# Patient Record
Sex: FEMALE | Race: WHITE | NOT HISPANIC OR LATINO
[De-identification: names, ages, dates, MRNs, and addresses within clinical notes are randomized per-mention and may not be internally consistent; named-entity substitution may affect disease eponyms.]

---

## 2017-03-07 ENCOUNTER — RESULT REVIEW (OUTPATIENT)
Age: 57
End: 2017-03-07

## 2018-01-17 ENCOUNTER — EMERGENCY (EMERGENCY)
Facility: HOSPITAL | Age: 58
LOS: 1 days | Discharge: HOME | End: 2018-01-17
Admitting: EMERGENCY MEDICINE
Payer: COMMERCIAL

## 2018-01-17 VITALS
TEMPERATURE: 98 F | SYSTOLIC BLOOD PRESSURE: 121 MMHG | RESPIRATION RATE: 16 BRPM | DIASTOLIC BLOOD PRESSURE: 77 MMHG | HEART RATE: 80 BPM | OXYGEN SATURATION: 99 %

## 2018-01-17 PROCEDURE — 73120 X-RAY EXAM OF HAND: CPT

## 2018-01-17 PROCEDURE — 29125 APPL SHORT ARM SPLINT STATIC: CPT

## 2018-01-17 PROCEDURE — 99053 MED SERV 10PM-8AM 24 HR FAC: CPT

## 2018-01-17 PROCEDURE — 73110 X-RAY EXAM OF WRIST: CPT | Mod: 26,RT

## 2018-01-17 PROCEDURE — 29125 APPL SHORT ARM SPLINT STATIC: CPT | Mod: RT

## 2018-01-17 PROCEDURE — 99284 EMERGENCY DEPT VISIT MOD MDM: CPT | Mod: 25

## 2018-01-17 PROCEDURE — 73110 X-RAY EXAM OF WRIST: CPT

## 2018-01-17 PROCEDURE — 73120 X-RAY EXAM OF HAND: CPT | Mod: 26,50

## 2018-01-17 PROCEDURE — 90715 TDAP VACCINE 7 YRS/> IM: CPT

## 2018-01-17 PROCEDURE — 90471 IMMUNIZATION ADMIN: CPT

## 2018-01-17 RX ORDER — ACETAMINOPHEN 500 MG
975 TABLET ORAL ONCE
Qty: 0 | Refills: 0 | Status: COMPLETED | OUTPATIENT
Start: 2018-01-17 | End: 2018-01-17

## 2018-01-17 RX ORDER — TETANUS TOXOID, REDUCED DIPHTHERIA TOXOID AND ACELLULAR PERTUSSIS VACCINE, ADSORBED 5; 2.5; 8; 8; 2.5 [IU]/.5ML; [IU]/.5ML; UG/.5ML; UG/.5ML; UG/.5ML
0.5 SUSPENSION INTRAMUSCULAR ONCE
Qty: 0 | Refills: 0 | Status: COMPLETED | OUTPATIENT
Start: 2018-01-17 | End: 2018-01-17

## 2018-01-17 RX ADMIN — Medication 975 MILLIGRAM(S): at 08:28

## 2018-01-17 RX ADMIN — TETANUS TOXOID, REDUCED DIPHTHERIA TOXOID AND ACELLULAR PERTUSSIS VACCINE, ADSORBED 0.5 MILLILITER(S): 5; 2.5; 8; 8; 2.5 SUSPENSION INTRAMUSCULAR at 08:28

## 2018-01-17 NOTE — ED PROVIDER NOTE - OBJECTIVE STATEMENT
mech fall R wrist pain.  onset:  1 hrs pta. quality:  swollen and dull ache.  Intensity:  rest 3/10, movement 8/10.  Associated Sx:  fell forward and sustained facial abrasions; no LOC, no HA, no epistaxis. mech fall R wrist pain.  onset:  1 hrs pta. quality:  swollen and dull ache.  Intensity:  rest 3/10, movement 8/10.  Associated Sx:  fell forward and sustained facial abrasions; no LOC, no HA, no epistaxis. Unknown last tetanus. No weakness/numbness.

## 2018-01-17 NOTE — ED PROVIDER NOTE - PROGRESS NOTE DETAILS
No fracture but will place in volar splint for comfort. Follow-up PMD. Strict return precautions provided.

## 2018-01-17 NOTE — ED PROVIDER NOTE - ATTENDING CONTRIBUTION TO CARE
65F, R wrist pain concerning for closed Fx.  +facial injury w/o signs or sx that would necessitate imaging.  Plan: xrays, reassess. Tetanus.

## 2018-01-17 NOTE — ED PROVIDER NOTE - MUSCULOSKELETAL MINIMAL EXAM
RANGE OF MOTION LIMITED/R wrist swollen R wrist swollen; ttp over radial and dorsal aspect/RANGE OF MOTION LIMITED RANGE OF MOTION LIMITED/R wrist slightly swollen distally; ttp over radial and dorsal aspect

## 2018-01-17 NOTE — ED ADULT NURSE NOTE - OBJECTIVE STATEMENT
65 year old female presents to ED c/o fall facial injury and bilateral wrist pain s/p slip and fall this am no loc, nausea or vomiting. Denies blood thinners.  No deformity noted to wrists, (+) bilateral radial pulses.  Limited rom to wrists secondary to pain. no other areas of injury or pain. In no acute distress. 65 year old female presents to ED c/o fall facial injury and bilateral wrist pain s/p slip and fall this am no loc, nausea or vomiting. Denies blood thinners.  No deformity noted to wrists, (+) bilateral radial pulses.  Limited rom to wrists secondary to pain. no other areas of injury or pain. In no acute distress. Unknown tetanus status.

## 2018-01-17 NOTE — ED ADULT NURSE REASSESSMENT NOTE - NS ED NURSE REASSESS COMMENT FT1
R wrist splinted, verified placement by resident. Patient denies tingling. Fingers pink and mobile. Bacitracin applied to facial abrasions.

## 2018-01-17 NOTE — ED PROVIDER NOTE - MEDICAL DECISION MAKING DETAILS
65F, R wrist pain concerning for closed Fx.  +facial injury w/o signs or sx that would necessitate imaging.  Plan: xrays, reassess. 65F, R wrist pain concerning for closed Fx.  +facial injury w/o signs or sx that would necessitate imaging.  Plan: xrays, reassess. Tetanus. 65F, R wrist pain concerning for buckle fx for distal radius Fx.  +facial injury w/o signs or sx that would necessitate imaging.  Plan: xrays, reassess. Tetanus.

## 2018-01-17 NOTE — ED PROCEDURE NOTE - ATTENDING CONTRIBUTION TO CARE
MD Bernstein:  patient seen and evaluated with the resident.  I agree with the above procedure note.

## 2018-01-17 NOTE — ED PROVIDER NOTE - PRINCIPAL DIAGNOSIS
Closed Colles' fracture of right radius, initial encounter Contusion of right wrist, initial encounter

## 2018-07-21 ENCOUNTER — TRANSCRIPTION ENCOUNTER (OUTPATIENT)
Age: 58
End: 2018-07-21

## 2019-05-13 ENCOUNTER — TRANSCRIPTION ENCOUNTER (OUTPATIENT)
Age: 59
End: 2019-05-13

## 2019-12-09 ENCOUNTER — TRANSCRIPTION ENCOUNTER (OUTPATIENT)
Age: 59
End: 2019-12-09

## 2020-06-29 ENCOUNTER — APPOINTMENT (OUTPATIENT)
Dept: ORTHOPEDIC SURGERY | Facility: CLINIC | Age: 60
End: 2020-06-29
Payer: COMMERCIAL

## 2020-06-29 VITALS
HEART RATE: 97 BPM | TEMPERATURE: 97.7 F | WEIGHT: 160 LBS | HEIGHT: 63 IN | DIASTOLIC BLOOD PRESSURE: 76 MMHG | SYSTOLIC BLOOD PRESSURE: 116 MMHG | BODY MASS INDEX: 28.35 KG/M2

## 2020-06-29 PROCEDURE — 99204 OFFICE O/P NEW MOD 45 MIN: CPT

## 2020-06-29 PROCEDURE — 72100 X-RAY EXAM L-S SPINE 2/3 VWS: CPT

## 2020-06-29 RX ORDER — METHYLPREDNISOLONE 4 MG/1
4 TABLET ORAL
Qty: 1 | Refills: 1 | Status: ACTIVE | COMMUNITY
Start: 2020-06-29 | End: 1900-01-01

## 2020-07-13 ENCOUNTER — APPOINTMENT (OUTPATIENT)
Dept: ORTHOPEDIC SURGERY | Facility: CLINIC | Age: 60
End: 2020-07-13
Payer: COMMERCIAL

## 2020-07-13 VITALS — DIASTOLIC BLOOD PRESSURE: 79 MMHG | HEART RATE: 103 BPM | SYSTOLIC BLOOD PRESSURE: 119 MMHG | TEMPERATURE: 97.8 F

## 2020-07-13 DIAGNOSIS — M54.16 RADICULOPATHY, LUMBAR REGION: ICD-10-CM

## 2020-07-13 PROCEDURE — 99214 OFFICE O/P EST MOD 30 MIN: CPT

## 2020-08-03 ENCOUNTER — APPOINTMENT (OUTPATIENT)
Dept: ORTHOPEDIC SURGERY | Facility: CLINIC | Age: 60
End: 2020-08-03
Payer: COMMERCIAL

## 2020-08-03 VITALS — TEMPERATURE: 97.5 F

## 2020-08-03 DIAGNOSIS — M48.07 SPINAL STENOSIS, LUMBOSACRAL REGION: ICD-10-CM

## 2020-08-03 PROCEDURE — 99214 OFFICE O/P EST MOD 30 MIN: CPT

## 2020-09-16 ENCOUNTER — APPOINTMENT (OUTPATIENT)
Dept: ORTHOPEDIC SURGERY | Facility: CLINIC | Age: 60
End: 2020-09-16
Payer: COMMERCIAL

## 2020-09-16 VITALS — TEMPERATURE: 97.6 F

## 2020-09-16 DIAGNOSIS — M54.5 LOW BACK PAIN: ICD-10-CM

## 2020-09-16 PROCEDURE — 99214 OFFICE O/P EST MOD 30 MIN: CPT

## 2020-09-16 NOTE — PHYSICAL EXAM
[Normal] : Gait: normal [SLR] : negative straight leg raise [de-identified] : 5 out of 5 motor strength, sensation is intact and symmetrical full range of motion flexion extension and rotation, no palpatory tenderness full range of motion of hips knees shoulders and elbows (all four extremities), no atrophy, negative straight leg raise, no pathological reflexes, no swelling, normal ambulation, no apparent distress skin is intact, no palpable lymph nodes, no upper or lower extremity instability, alert and oriented x3 and normal mood. Normal finger-to nose test.  [de-identified] : MRI Pelvis 9/9/20 - asymmetric bone marrow signal changes right sacrum suggestive of bone marrow edema and reactive change in conjunction with stress fracture or microfracture. Follow-up evaluation recommended for additional assessment.\par \par Moderate bilateral sacroiliac joint degenerative changes.\par \par Mild bilateral hip degenerative changes.\par \par Neither ovary delineated from adjacent pelvic structures without evidence of adnexal mass suggested. Correlation with pelvic sonography can be obtained for additional evaluation. \par \par \par Stenosis at L4-5 \par MRI L spine (Stand Up MRI) 7/29/20 - poorly defined, partially visualized T2 hyperintense, T1 hypointense lesion laterally within the R sacral ala which is nonspecific but could represent a stress fracture in a osteopenic patient. The possibility of a malignant tumor such as metastatic disease or mutlple myeloma can not be excluded. Dedicated MRI of the sacrum may be helpful for further evaluation. T12/L1 posterior annular disc bulge deforms the ventral thecal sac. L1/2 central subligamentous disc herniation deforms the ventral thecal sac. L2/3 posterior annular disc bulge deforms the ventral thecal sac and increases peripherally approaching the neural foramina. L3/4 posterior annular disc bulge deforms the ventral thecal sac and there is narrowing of the neural foramina. At L4/5 the patient has undergone L hemilaminotomy and presumably a discectomy. Posterior annular disc bulge deforms the ventral thecal sac and there is peripheral L sided disc herniation approaching the L neural foramen. L foraminal narrowing with encroachment upon the exiting left L4 nerve root and there is prominent R foraminal extension of the disc bulge approaching the R L4 nerve root. L5/S1 posterior subligamentous disc bulge deforms the ventral epidural space.  Chronic compression fractures involving the central portion  of the superior L1 and L3 endplates on the R. \par \par AP/lat lumbar-L1 compression ? mild degenerative changes-reviewed with patient.

## 2020-09-16 NOTE — HISTORY OF PRESENT ILLNESS
[Improving] : improving [de-identified] : 60 year female presents for follow up of RLE radiculopathy. \par S/P lumbar decompression and removal of extruded fragment of L4/5, L hemilaminectomy L4/5 and fat graft placement on 7/2/2015.\par As per MRI lumbar review on last visit - "partially visualized T2 hyperintense, T1 hypointense lesion laterally within the R sacral ala which is nonspecific but could represent a stress fracture in a osteopenic patient. The possibility of a malignant tumor such as metastatic disease or mutlple myeloma can not be excluded"; MRI pelvis was ordered and patient presents today for review. \par She is feeling better.\par She underwent a pelvic MRI.\par Was prescribed MDP at prior visit and felt some relief of RLE radiculopathy but feels that she has pressure in her mid lower back.\par Her RLE pain has improved since taking MDP. \par No fever chills sweats nausea vomiting no bowel or bladder dysfunction, no recent weight loss or gain no night pain. This history is in addition to the intake form that I personally reviewed.

## 2020-09-16 NOTE — DISCUSSION/SUMMARY
[de-identified] : Sacral insufficiency osteoporotic fracture.\par She's getting better.\par She does have a history of an L1 and L3 osteoporotic fracture.\par She'll follow up with her OB/GYN as suggested by the radiologist for an ultrasound to assess her ovaries.\par She also followup with her primary care physician or her OB/GYN for evaluation and treatment for osteoporosis.\par Discussed all options. \par Degenerative disc disease. \par All options discussed including rest, medicine, home exercise, acupuncture, Chiropractic care, Physical Therapy, Pain management, and last resort surgery. All questions were answered, all alternatives discussed and the patient is in complete agreement with that plan. Follow-up appointment as instructed. Any issues and the patient will call or come in sooner.

## 2020-09-16 NOTE — ADDENDUM
[FreeTextEntry1] : This note was authored by Lyndsay Miramontes working as a medical scribe for Dr. Daniel Garber. The note was reviewed, edited, and revised by Dr. Daniel Garber whom is in agreement with the exam findings, imaging findings, and treatment plan. 09/16/2020.

## 2022-06-03 ENCOUNTER — NON-APPOINTMENT (OUTPATIENT)
Age: 62
End: 2022-06-03

## 2022-07-06 ENCOUNTER — NON-APPOINTMENT (OUTPATIENT)
Age: 62
End: 2022-07-06

## 2023-08-22 ENCOUNTER — NON-APPOINTMENT (OUTPATIENT)
Age: 63
End: 2023-08-22

## 2024-09-21 NOTE — ED ADULT TRIAGE NOTE - TEMPERATURE IN FAHRENHEIT (DEGREES F)
Sore throat since 1 day  Patient was concerned for strep throat  No fever  Strep A PCR - not detected  Symptomatic treatment   98.5

## 2025-04-28 ENCOUNTER — NON-APPOINTMENT (OUTPATIENT)
Age: 65
End: 2025-04-28

## 2025-07-08 ENCOUNTER — NON-APPOINTMENT (OUTPATIENT)
Age: 65
End: 2025-07-08